# Patient Record
Sex: MALE | Race: ASIAN | URBAN - METROPOLITAN AREA
[De-identification: names, ages, dates, MRNs, and addresses within clinical notes are randomized per-mention and may not be internally consistent; named-entity substitution may affect disease eponyms.]

---

## 2019-12-10 NOTE — PROGRESS NOTES
[facility] [place]: [place]  [phone]    History of Present Illness:  Mr. Espinoza Pino is a 37 yo M self-referred for cardiac evaluation. He was referred by [facility] [place] for cardiac evaluation. He is here due to recent chest pain. This is on and off for the last few days lasting several minutes at a time, sometimes radiation to the left arm. He does note that he ate a little bit more food prior. The discomfort initially started when he was watching television. There are no clear exacerbating factors when it is occurring. Overall, his breathing has been okay. Workup at [facility] was unrevealing. No prior cardiac history. This did happen approximately four months ago, similar symptoms and he does note he was under a lot of stress at the time. He has not been exercising, but his symptoms have not been exertional.  He is compensated on exam with clear lungs and no lower extremity edema. His EKG here is normal sinus rhythm, nonspecific ST-T wave abnormality, heart rate of 69. His blood pressure is 110/76 with a heart rate of 78. Soc hx. No tobacco. , has a daughter. 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 No early CAD  Assessment and Plan:    1. Chest pain. This is atypical and noncardiac. Could be GI as it was associated with food or musculoskeletal.  If his symptoms become exertional in the future, could always consider a stress test.  Reassured the patient. 2. Borderline dyslipidemia. He has been told his lipids have been slightly abnormal in the past and will recheck a lipid panel. He  has no past medical history on file. All other systems negative except as above. PE  Vitals:    12/11/19 1342   BP: 110/76   Pulse: 78   Resp: 10   SpO2: 98%   Weight: 158 lb 14.4 oz (72.1 kg)   Height: 5' 8\" (1.727 m)    Body mass index is 24.16 kg/m².    General appearance - alert, well appearing, and in no distress  Mental status - affect appropriate to mood  Eyes - sclera anicteric, moist mucous membranes  Neck - supple, no JVD  Chest - clear to auscultation, no wheezes, rales or rhonchi  Heart - normal rate, regular rhythm, normal S1, S2, no murmurs, rubs, clicks or gallops  Abdomen - soft, nontender, nondistended, no masses or organomegaly  Neurological -  no focal deficit  Extremities - peripheral pulses normal, no pedal edema      Recent Labs:  No results found for: CHOL, CHOLX, CHLST, CHOLV, 443978, HDL, HDLP, LDL, LDLC, DLDLP, TGLX, TRIGL, TRIGP, CHHD, CHHDX  No results found for: SURESH, CREAPOC, ACREA, CREA, REFC3, REFC4  No results found for: BUN, BUNPOC, IBUN, MBUNV, BUNV  No results found for: K, KI, PLK, WBK  No results found for: HBA1C, HGBE8, OIP2QTNN, [id number]  No results found for: HGBPOC, HGB, HGBP, HGBEXT, HGBEXT  No results found for: PLT, PLTEXT, PLTEXT    Reviewed:  History reviewed. No pertinent past medical history. Social History     Tobacco Use   Smoking Status Current Some Day Smoker    Years: 0.50   Smokeless Tobacco Never Used     Social History     Substance and Sexual Activity   Alcohol Use Not Currently     Not on File    No current outpatient medications on file. No current facility-administered medications for this visit.         Benjy Painting MD  [facility] [place]  [address] [facility] [address]  [address]

## 2019-12-11 ENCOUNTER — OFFICE VISIT (OUTPATIENT)
Dept: CARDIOLOGY CLINIC | Age: 34
End: 2019-12-11

## 2019-12-11 VITALS
DIASTOLIC BLOOD PRESSURE: 76 MMHG | HEART RATE: 78 BPM | WEIGHT: 158.9 LBS | BODY MASS INDEX: 24.08 KG/M2 | OXYGEN SATURATION: 98 % | SYSTOLIC BLOOD PRESSURE: 110 MMHG | HEIGHT: 68 IN | RESPIRATION RATE: 10 BRPM

## 2019-12-11 DIAGNOSIS — R07.89 TIGHTNESS IN CHEST: Primary | ICD-10-CM

## 2019-12-11 DIAGNOSIS — E78.5 BORDERLINE HYPERLIPIDEMIA: ICD-10-CM

## 2019-12-16 LAB
CHOL/HDL RATIO,CHHDX: 5.9 (CALC)
CHOLEST SERPL-MCNC: 208 MG/DL
HDLC SERPL-MCNC: 35 MG/DL
LDL-CHOLESTEROL: 145 MG/DL (CALC)
NON-HDL CHOLESTEROL, 011976: 173 MG/DL (CALC)
TRIGL SERPL-MCNC: 153 MG/DL (ref ?–150)